# Patient Record
Sex: FEMALE | Race: ASIAN | NOT HISPANIC OR LATINO | URBAN - METROPOLITAN AREA
[De-identification: names, ages, dates, MRNs, and addresses within clinical notes are randomized per-mention and may not be internally consistent; named-entity substitution may affect disease eponyms.]

---

## 2017-03-01 ENCOUNTER — OUTPATIENT (OUTPATIENT)
Dept: OUTPATIENT SERVICES | Facility: HOSPITAL | Age: 37
LOS: 1 days | End: 2017-03-01

## 2017-03-01 VITALS
TEMPERATURE: 98 F | WEIGHT: 110.01 LBS | HEART RATE: 76 BPM | DIASTOLIC BLOOD PRESSURE: 60 MMHG | RESPIRATION RATE: 16 BRPM | SYSTOLIC BLOOD PRESSURE: 100 MMHG | HEIGHT: 61 IN

## 2017-03-01 DIAGNOSIS — K60.2 ANAL FISSURE, UNSPECIFIED: ICD-10-CM

## 2017-03-01 LAB
HCG SERPL-ACNC: < 5 MIU/ML — SIGNIFICANT CHANGE UP
HCT VFR BLD CALC: 34.9 % — SIGNIFICANT CHANGE UP (ref 34.5–45)
HGB BLD-MCNC: 11.1 G/DL — LOW (ref 11.5–15.5)
MCHC RBC-ENTMCNC: 27.8 PG — SIGNIFICANT CHANGE UP (ref 27–34)
MCHC RBC-ENTMCNC: 31.8 % — LOW (ref 32–36)
MCV RBC AUTO: 87.3 FL — SIGNIFICANT CHANGE UP (ref 80–100)
PLATELET # BLD AUTO: 317 K/UL — SIGNIFICANT CHANGE UP (ref 150–400)
PMV BLD: 10.5 FL — SIGNIFICANT CHANGE UP (ref 7–13)
RBC # BLD: 4 M/UL — SIGNIFICANT CHANGE UP (ref 3.8–5.2)
RBC # FLD: 14 % — SIGNIFICANT CHANGE UP (ref 10.3–14.5)
WBC # BLD: 5.58 K/UL — SIGNIFICANT CHANGE UP (ref 3.8–10.5)
WBC # FLD AUTO: 5.58 K/UL — SIGNIFICANT CHANGE UP (ref 3.8–10.5)

## 2017-03-01 NOTE — H&P PST ADULT - GASTROINTESTINAL COMMENTS
Chronic constipation, takes Linzess with improvement.  Pt reports hx of rectal bleeding with BM's which started 2 years ago, and stopped 2 months ago since she started taking medications for constipation.   Pt reports rectal pain staretd 1 month and worsened.  Dx with anal fissure

## 2017-03-01 NOTE — H&P PST ADULT - PMH
GERD (gastroesophageal reflux disease) Chronic constipation    GERD (gastroesophageal reflux disease)

## 2017-03-01 NOTE — H&P PST ADULT - ASSESSMENT
36 y/o female with 2 year hx of constipation and rectal bleeding with BM's. Pt reports she has not had rectal bleeding in 2 months since she started on medications for constipation.  Rectal pain started x 1 month, and has been worsening. Dx with anal fissure, scheduled for examination under anesthesia sphincterotomy anal fissurectomy 3/2/17.

## 2017-03-01 NOTE — H&P PST ADULT - HISTORY OF PRESENT ILLNESS
38 y/o female with hx of rectal pain x 1 month, worsening. Dx with ranal fissure, scheduled for examination under anesthesia sphincterotomy anal fissurectomy 3/2/17. 38 y/o female with 2 year hx of constipation and rectal bleeding with BM's. Pt reports she has not had rectal bleeding in 2 months since she started on medications for constipation.  Rectal pain started x 1 month, and has been worsening. Dx with anal fissure, scheduled for examination under anesthesia sphincterotomy anal fissurectomy 3/2/17.

## 2017-03-01 NOTE — H&P PST ADULT - FAMILY HISTORY
Mother  Still living? Yes, Estimated age: Age Unknown  Family history of diabetes mellitus, Age at diagnosis: Age Unknown     Sibling  Still living? Yes, Estimated age: Age Unknown  Family history of breast cancer, Age at diagnosis: Age Unknown

## 2017-03-02 ENCOUNTER — OUTPATIENT (OUTPATIENT)
Dept: OUTPATIENT SERVICES | Facility: HOSPITAL | Age: 37
LOS: 1 days | Discharge: HOME | End: 2017-03-02
Payer: MEDICAID

## 2017-03-02 VITALS
HEART RATE: 59 BPM | RESPIRATION RATE: 16 BRPM | OXYGEN SATURATION: 99 % | HEIGHT: 61 IN | DIASTOLIC BLOOD PRESSURE: 58 MMHG | SYSTOLIC BLOOD PRESSURE: 131 MMHG | TEMPERATURE: 99 F | WEIGHT: 110.01 LBS

## 2017-03-02 VITALS
HEART RATE: 86 BPM | OXYGEN SATURATION: 100 % | SYSTOLIC BLOOD PRESSURE: 116 MMHG | TEMPERATURE: 208 F | DIASTOLIC BLOOD PRESSURE: 65 MMHG | RESPIRATION RATE: 20 BRPM

## 2017-03-02 DIAGNOSIS — K60.2 ANAL FISSURE, UNSPECIFIED: ICD-10-CM

## 2017-03-02 PROCEDURE — 88304 TISSUE EXAM BY PATHOLOGIST: CPT | Mod: 26

## 2017-03-02 RX ORDER — OXYCODONE HYDROCHLORIDE 5 MG/1
1 TABLET ORAL
Qty: 30 | Refills: 0 | OUTPATIENT
Start: 2017-03-02

## 2017-03-07 LAB — SURGICAL PATHOLOGY STUDY: SIGNIFICANT CHANGE UP

## 2017-06-29 NOTE — ASU PREOPERATIVE ASSESSMENT, ADULT ([facility] ONLY) - SPO2 (%)
Please call patient, results are within normal limits.
Please call patient, results are within normal limits.
99

## 2018-02-04 ENCOUNTER — INPATIENT (INPATIENT)
Facility: HOSPITAL | Age: 38
LOS: 1 days | Discharge: HOME | End: 2018-02-06
Attending: SPECIALIST | Admitting: SPECIALIST

## 2018-02-04 VITALS — HEIGHT: 63 IN | WEIGHT: 141.1 LBS

## 2018-02-04 DIAGNOSIS — O26.899 OTHER SPECIFIED PREGNANCY RELATED CONDITIONS, UNSPECIFIED TRIMESTER: ICD-10-CM

## 2018-02-04 LAB
BASOPHILS # BLD AUTO: 0.05 K/UL — SIGNIFICANT CHANGE UP (ref 0–0.2)
BASOPHILS NFR BLD AUTO: 0.4 % — SIGNIFICANT CHANGE UP (ref 0–2)
BLD GP AB SCN SERPL QL: NEGATIVE — SIGNIFICANT CHANGE UP
EOSINOPHIL # BLD AUTO: 0.14 K/UL — SIGNIFICANT CHANGE UP (ref 0–0.5)
EOSINOPHIL NFR BLD AUTO: 1.2 % — SIGNIFICANT CHANGE UP (ref 0–6)
HCT VFR BLD CALC: 38.6 % — SIGNIFICANT CHANGE UP (ref 34.5–45)
HGB BLD-MCNC: 13 G/DL — SIGNIFICANT CHANGE UP (ref 11.5–15.5)
IMM GRANULOCYTES # BLD AUTO: 0.33 # — SIGNIFICANT CHANGE UP
IMM GRANULOCYTES NFR BLD AUTO: 2.8 % — HIGH (ref 0–1.5)
LYMPHOCYTES # BLD AUTO: 18.3 % — SIGNIFICANT CHANGE UP (ref 13–44)
LYMPHOCYTES # BLD AUTO: 2.14 K/UL — SIGNIFICANT CHANGE UP (ref 1–3.3)
MCHC RBC-ENTMCNC: 31.3 PG — SIGNIFICANT CHANGE UP (ref 27–34)
MCHC RBC-ENTMCNC: 33.7 % — SIGNIFICANT CHANGE UP (ref 32–36)
MCV RBC AUTO: 93 FL — SIGNIFICANT CHANGE UP (ref 80–100)
MONOCYTES # BLD AUTO: 0.93 K/UL — HIGH (ref 0–0.9)
MONOCYTES NFR BLD AUTO: 7.9 % — SIGNIFICANT CHANGE UP (ref 2–14)
NEUTROPHILS # BLD AUTO: 8.11 K/UL — HIGH (ref 1.8–7.4)
NEUTROPHILS NFR BLD AUTO: 69.4 % — SIGNIFICANT CHANGE UP (ref 43–77)
NRBC # FLD: 0 — SIGNIFICANT CHANGE UP
PLATELET # BLD AUTO: 261 K/UL — SIGNIFICANT CHANGE UP (ref 150–400)
PMV BLD: 10.2 FL — SIGNIFICANT CHANGE UP (ref 7–13)
RBC # BLD: 4.15 M/UL — SIGNIFICANT CHANGE UP (ref 3.8–5.2)
RBC # FLD: 14.2 % — SIGNIFICANT CHANGE UP (ref 10.3–14.5)
RH IG SCN BLD-IMP: POSITIVE — SIGNIFICANT CHANGE UP
RH IG SCN BLD-IMP: POSITIVE — SIGNIFICANT CHANGE UP
WBC # BLD: 11.7 K/UL — HIGH (ref 3.8–10.5)
WBC # FLD AUTO: 11.7 K/UL — HIGH (ref 3.8–10.5)

## 2018-02-04 RX ORDER — OXYTOCIN 10 UNIT/ML
333.33 VIAL (ML) INJECTION
Qty: 20 | Refills: 0 | Status: DISCONTINUED | OUTPATIENT
Start: 2018-02-04 | End: 2018-02-05

## 2018-02-04 RX ORDER — SODIUM CHLORIDE 9 MG/ML
1000 INJECTION, SOLUTION INTRAVENOUS
Qty: 0 | Refills: 0 | Status: DISCONTINUED | OUTPATIENT
Start: 2018-02-04 | End: 2018-02-04

## 2018-02-04 RX ORDER — OXYTOCIN 10 UNIT/ML
333.33 VIAL (ML) INJECTION
Qty: 20 | Refills: 0 | Status: COMPLETED | OUTPATIENT
Start: 2018-02-04

## 2018-02-04 RX ORDER — OXYTOCIN 10 UNIT/ML
2 VIAL (ML) INJECTION
Qty: 30 | Refills: 0 | Status: DISCONTINUED | OUTPATIENT
Start: 2018-02-04 | End: 2018-02-05

## 2018-02-04 RX ORDER — SODIUM CHLORIDE 9 MG/ML
1000 INJECTION, SOLUTION INTRAVENOUS ONCE
Qty: 0 | Refills: 0 | Status: DISCONTINUED | OUTPATIENT
Start: 2018-02-04 | End: 2018-02-04

## 2018-02-04 RX ADMIN — SODIUM CHLORIDE 250 MILLILITER(S): 9 INJECTION, SOLUTION INTRAVENOUS at 12:06

## 2018-02-04 RX ADMIN — Medication 1000 MILLIUNIT(S)/MIN: at 21:35

## 2018-02-04 RX ADMIN — Medication 2 MILLIUNIT(S)/MIN: at 13:44

## 2018-02-04 NOTE — DISCHARGE NOTE OB - MEDICATION SUMMARY - MEDICATIONS TO TAKE
I will START or STAY ON the medications listed below when I get home from the hospital:    acetaminophen 325 mg oral tablet  -- 3 tab(s) by mouth every 6 hours, As Needed  -- Indication: For moderate pain    ibuprofen 600 mg oral tablet  -- 1 tab(s) by mouth every 6 hours, As Needed  -- Indication: For moderate pain    Prenatal Multivitamins with Folic Acid 1 mg oral tablet  -- 1 tab(s) by mouth once a day  -- Indication: For supplement

## 2018-02-04 NOTE — DISCHARGE NOTE OB - MEDICATION SUMMARY - MEDICATIONS TO STOP TAKING
I will STOP taking the medications listed below when I get home from the hospital:    acetaminophen-oxyCODONE 325 mg-5 mg oral tablet  -- 1 tab(s) by mouth every 3 to 4 hours, As Needed -for severe pain MDD:8  -- Caution federal law prohibits the transfer of this drug to any person other  than the person for whom it was prescribed.  May cause drowsiness.  Alcohol may intensify this effect.  Use care when operating dangerous machinery.  This prescription cannot be refilled.  This product contains acetaminophen.  Do not use  with any other product containing acetaminophen to prevent possible liver damage.  Using more of this medication than prescribed may cause serious breathing problems.    Pepcid 20 mg oral tablet  -- 1 tab(s) by mouth 2 times a day

## 2018-02-04 NOTE — DISCHARGE NOTE OB - MATERIALS PROVIDED
Guide to Postpartum Care/[facility] Hearing Screen Program/Tdap Vaccination (VIS Pub Date: 2012)/Vaccinations/[facility]  Screening Program/Shaken Baby Prevention Handout/[place]  Immunization Record

## 2018-02-04 NOTE — DISCHARGE NOTE OB - CARE PROVIDER_API CALL
Ruth Green), Obstetrics and Gynecology  [address] [place] [address]  Phone: [phone]  Fax: [phone]

## 2018-02-04 NOTE — DISCHARGE NOTE OB - PATIENT PORTAL LINK FT
You can access the [facility] Patient Portal, offered by [facility], by registering with the following website: [email or web address]

## 2018-02-05 LAB — T PALLIDUM AB TITR SER: NEGATIVE — SIGNIFICANT CHANGE UP

## 2018-02-05 RX ORDER — SIMETHICONE 80 MG/1
80 TABLET, CHEWABLE ORAL EVERY 6 HOURS
Qty: 0 | Refills: 0 | Status: DISCONTINUED | OUTPATIENT
Start: 2018-02-05 | End: 2018-02-06

## 2018-02-05 RX ORDER — IBUPROFEN 200 MG
1 TABLET ORAL
Qty: 0 | Refills: 0 | COMMUNITY
Start: 2018-02-05

## 2018-02-05 RX ORDER — FAMOTIDINE 10 MG/ML
1 INJECTION INTRAVENOUS
Qty: 0 | Refills: 0 | COMMUNITY

## 2018-02-05 RX ORDER — DOCUSATE SODIUM 100 MG
100 CAPSULE ORAL
Qty: 0 | Refills: 0 | Status: DISCONTINUED | OUTPATIENT
Start: 2018-02-05 | End: 2018-02-06

## 2018-02-05 RX ORDER — HYDROCORTISONE 1 %
1 OINTMENT (GRAM) TOPICAL EVERY 4 HOURS
Qty: 0 | Refills: 0 | Status: DISCONTINUED | OUTPATIENT
Start: 2018-02-05 | End: 2018-02-06

## 2018-02-05 RX ORDER — OXYCODONE HYDROCHLORIDE 5 MG/1
5 TABLET ORAL
Qty: 0 | Refills: 0 | Status: DISCONTINUED | OUTPATIENT
Start: 2018-02-05 | End: 2018-02-06

## 2018-02-05 RX ORDER — OXYCODONE HYDROCHLORIDE 5 MG/1
5 TABLET ORAL EVERY 4 HOURS
Qty: 0 | Refills: 0 | Status: DISCONTINUED | OUTPATIENT
Start: 2018-02-05 | End: 2018-02-06

## 2018-02-05 RX ORDER — ACETAMINOPHEN 500 MG
975 TABLET ORAL EVERY 6 HOURS
Qty: 0 | Refills: 0 | Status: DISCONTINUED | OUTPATIENT
Start: 2018-02-05 | End: 2018-02-06

## 2018-02-05 RX ORDER — IBUPROFEN 200 MG
600 TABLET ORAL EVERY 6 HOURS
Qty: 0 | Refills: 0 | Status: DISCONTINUED | OUTPATIENT
Start: 2018-02-05 | End: 2018-02-06

## 2018-02-05 RX ORDER — TETANUS TOXOID, REDUCED DIPHTHERIA TOXOID AND ACELLULAR PERTUSSIS VACCINE, ADSORBED 5; 2.5; 8; 8; 2.5 [IU]/.5ML; [IU]/.5ML; UG/.5ML; UG/.5ML; UG/.5ML
0.5 SUSPENSION INTRAMUSCULAR ONCE
Qty: 0 | Refills: 0 | Status: DISCONTINUED | OUTPATIENT
Start: 2018-02-05 | End: 2018-02-06

## 2018-02-05 RX ORDER — ACETAMINOPHEN 500 MG
975 TABLET ORAL EVERY 6 HOURS
Qty: 0 | Refills: 0 | Status: COMPLETED | OUTPATIENT
Start: 2018-02-05 | End: 2019-01-04

## 2018-02-05 RX ORDER — LANOLIN
1 OINTMENT (GRAM) TOPICAL EVERY 6 HOURS
Qty: 0 | Refills: 0 | Status: DISCONTINUED | OUTPATIENT
Start: 2018-02-05 | End: 2018-02-06

## 2018-02-05 RX ORDER — GLYCERIN ADULT
1 SUPPOSITORY, RECTAL RECTAL AT BEDTIME
Qty: 0 | Refills: 0 | Status: DISCONTINUED | OUTPATIENT
Start: 2018-02-05 | End: 2018-02-06

## 2018-02-05 RX ORDER — ACETAMINOPHEN 500 MG
3 TABLET ORAL
Qty: 0 | Refills: 0 | COMMUNITY
Start: 2018-02-05

## 2018-02-05 RX ORDER — SODIUM CHLORIDE 9 MG/ML
3 INJECTION INTRAMUSCULAR; INTRAVENOUS; SUBCUTANEOUS EVERY 8 HOURS
Qty: 0 | Refills: 0 | Status: DISCONTINUED | OUTPATIENT
Start: 2018-02-05 | End: 2018-02-06

## 2018-02-05 RX ORDER — IBUPROFEN 200 MG
600 TABLET ORAL EVERY 6 HOURS
Qty: 0 | Refills: 0 | Status: COMPLETED | OUTPATIENT
Start: 2018-02-05 | End: 2019-01-04

## 2018-02-05 RX ORDER — PRAMOXINE HYDROCHLORIDE 150 MG/15G
1 AEROSOL, FOAM RECTAL EVERY 4 HOURS
Qty: 0 | Refills: 0 | Status: DISCONTINUED | OUTPATIENT
Start: 2018-02-05 | End: 2018-02-05

## 2018-02-05 RX ORDER — OXYTOCIN 10 UNIT/ML
41.67 VIAL (ML) INJECTION
Qty: 20 | Refills: 0 | Status: DISCONTINUED | OUTPATIENT
Start: 2018-02-05 | End: 2018-02-05

## 2018-02-05 RX ORDER — DIBUCAINE 1 %
1 OINTMENT (GRAM) RECTAL EVERY 4 HOURS
Qty: 0 | Refills: 0 | Status: DISCONTINUED | OUTPATIENT
Start: 2018-02-05 | End: 2018-02-06

## 2018-02-05 RX ORDER — AER TRAVELER 0.5 G/1
1 SOLUTION RECTAL; TOPICAL EVERY 4 HOURS
Qty: 0 | Refills: 0 | Status: DISCONTINUED | OUTPATIENT
Start: 2018-02-05 | End: 2018-02-06

## 2018-02-05 RX ORDER — MAGNESIUM HYDROXIDE 400 MG/1
30 TABLET, CHEWABLE ORAL
Qty: 0 | Refills: 0 | Status: DISCONTINUED | OUTPATIENT
Start: 2018-02-05 | End: 2018-02-06

## 2018-02-05 RX ORDER — PRAMOXINE HYDROCHLORIDE 150 MG/15G
1 AEROSOL, FOAM RECTAL EVERY 4 HOURS
Qty: 0 | Refills: 0 | Status: DISCONTINUED | OUTPATIENT
Start: 2018-02-05 | End: 2018-02-06

## 2018-02-05 RX ORDER — DIPHENHYDRAMINE HCL 50 MG
25 CAPSULE ORAL EVERY 6 HOURS
Qty: 0 | Refills: 0 | Status: DISCONTINUED | OUTPATIENT
Start: 2018-02-05 | End: 2018-02-06

## 2018-02-05 RX ORDER — HYDROCORTISONE 1 %
1 OINTMENT (GRAM) TOPICAL EVERY 4 HOURS
Qty: 0 | Refills: 0 | Status: DISCONTINUED | OUTPATIENT
Start: 2018-02-05 | End: 2018-02-05

## 2018-02-05 RX ADMIN — Medication 600 MILLIGRAM(S): at 09:25

## 2018-02-05 RX ADMIN — Medication 975 MILLIGRAM(S): at 09:55

## 2018-02-05 RX ADMIN — Medication 600 MILLIGRAM(S): at 18:45

## 2018-02-05 RX ADMIN — Medication 1 APPLICATION(S): at 21:14

## 2018-02-05 RX ADMIN — AER TRAVELER 1 APPLICATION(S): 0.5 SOLUTION RECTAL; TOPICAL at 21:15

## 2018-02-05 RX ADMIN — Medication 600 MILLIGRAM(S): at 09:55

## 2018-02-05 RX ADMIN — SIMETHICONE 80 MILLIGRAM(S): 80 TABLET, CHEWABLE ORAL at 18:24

## 2018-02-05 RX ADMIN — SODIUM CHLORIDE 3 MILLILITER(S): 9 INJECTION INTRAMUSCULAR; INTRAVENOUS; SUBCUTANEOUS at 22:22

## 2018-02-05 RX ADMIN — Medication 600 MILLIGRAM(S): at 18:15

## 2018-02-05 RX ADMIN — PRAMOXINE HYDROCHLORIDE 1 APPLICATION(S): 150 AEROSOL, FOAM RECTAL at 21:14

## 2018-02-05 RX ADMIN — Medication 100 MILLIGRAM(S): at 09:27

## 2018-02-05 RX ADMIN — Medication 975 MILLIGRAM(S): at 09:25

## 2018-02-05 RX ADMIN — Medication 1 TABLET(S): at 09:27

## 2018-02-05 RX ADMIN — Medication 975 MILLIGRAM(S): at 18:15

## 2018-02-05 RX ADMIN — MAGNESIUM HYDROXIDE 30 MILLILITER(S): 400 TABLET, CHEWABLE ORAL at 21:14

## 2018-02-05 RX ADMIN — Medication 975 MILLIGRAM(S): at 18:45

## 2018-02-05 NOTE — PROGRESS NOTE ADULT - SUBJECTIVE AND OBJECTIVE BOX
Patient seen and examined at bedside, no acute overnight events. States she had a headache overnight and is having discomfort this morning but declined pain medicine until after she eats breakfast. Denies any HA, blurry vision, dizziness, CP/SOB, N/V. Patient is ambulating, voiding spontaneously, passing gas, and tolerating regular diet. Pt is breast feeding her baby.    Vital Signs Last 24 Hours  T(C): 36.7 (02-05-18 @ 06:38), Max: 36.8 (02-04-18 @ 22:36)  HR: 82 (02-05-18 @ 06:38) (82 - 107)  BP: 104/52 (02-05-18 @ 06:38) (96/51 - 141/62)  RR: 17 (02-05-18 @ 06:38) (17 - 19)  SpO2: 96% (02-05-18 @ 06:38) (95% - 100%)    Physical Exam:  General: NAD  CV: RRR  Pulm: CTAB  Abdomen: Soft, non-tender, non-distended  Pelvic: Lochia wnl; fundus firm and non-tender   Extremities: no calf tenderness noted on exam    Labs:    Blood Type: AB Positive  Antibody Screen: --  RPR: Negative               13.0   11.70 )-----------( 261      ( 02-04 @ 11:05 )             38.6         MEDICATIONS  (STANDING):  acetaminophen   Tablet. 975 milliGRAM(s) Oral every 6 hours  diphtheria/tetanus/pertussis (acellular) Vaccine (ADAcel) 0.5 milliLiter(s) IntraMuscular once  ibuprofen  Tablet 600 milliGRAM(s) Oral every 6 hours  oxyCODONE    IR 5 milliGRAM(s) Oral every 3 hours  oxytocin Infusion 41.667 milliUNIT(s)/Min (125 mL/Hr) IV Continuous <Continuous>  prenatal multivitamin 1 Tablet(s) Oral daily  sodium chloride 0.9% lock flush 3 milliLiter(s) IV Push every 8 hours    MEDICATIONS  (PRN):  dibucaine 1% Ointment 1 Application(s) Topical every 4 hours PRN Perineal Discomfort  diphenhydrAMINE   Capsule 25 milliGRAM(s) Oral every 6 hours PRN Itching  docusate sodium 100 milliGRAM(s) Oral two times a day PRN Stool Softening  glycerin Suppository - Adult 1 Suppository(s) Rectal at bedtime PRN Constipation  hydrocortisone 1% Cream 1 Application(s) Topical every 4 hours PRN perineal discomfort  lanolin Ointment 1 Application(s) Topical every 6 hours PRN Sore Nipples  magnesium hydroxide Suspension 30 milliLiter(s) Oral two times a day PRN Constipation  oxyCODONE    IR 5 milliGRAM(s) Oral every 4 hours PRN Severe Pain (7 -10)  pramoxine 1%/zinc 5% Cream 1 Application(s) Topical every 4 hours PRN perineal discomfort  simethicone 80 milliGRAM(s) Chew every 6 hours PRN Gas  witch hazel Pads 1 Application(s) Topical every 4 hours PRN Perineal Discomfort

## 2018-02-05 NOTE — PROGRESS NOTE ADULT - PROBLEM SELECTOR PLAN 1
- Continue with po analgesia  - Increase ambulation  - Continue regular diet  - IV lock  - No labs    KODY Chi, PGY-1

## 2018-02-06 VITALS
TEMPERATURE: 98 F | DIASTOLIC BLOOD PRESSURE: 63 MMHG | SYSTOLIC BLOOD PRESSURE: 109 MMHG | HEART RATE: 73 BPM | OXYGEN SATURATION: 97 % | RESPIRATION RATE: 16 BRPM

## 2018-02-06 RX ADMIN — Medication 975 MILLIGRAM(S): at 12:45

## 2018-02-06 RX ADMIN — Medication 975 MILLIGRAM(S): at 07:14

## 2018-02-06 RX ADMIN — SIMETHICONE 80 MILLIGRAM(S): 80 TABLET, CHEWABLE ORAL at 06:44

## 2018-02-06 RX ADMIN — Medication 975 MILLIGRAM(S): at 01:17

## 2018-02-06 RX ADMIN — Medication 975 MILLIGRAM(S): at 06:44

## 2018-02-06 RX ADMIN — Medication 600 MILLIGRAM(S): at 01:50

## 2018-02-06 RX ADMIN — Medication 600 MILLIGRAM(S): at 01:17

## 2018-02-06 RX ADMIN — Medication 975 MILLIGRAM(S): at 12:15

## 2018-02-06 RX ADMIN — Medication 600 MILLIGRAM(S): at 12:15

## 2018-02-06 RX ADMIN — Medication 1 TABLET(S): at 12:18

## 2018-02-06 RX ADMIN — SIMETHICONE 80 MILLIGRAM(S): 80 TABLET, CHEWABLE ORAL at 01:17

## 2018-02-06 RX ADMIN — Medication 600 MILLIGRAM(S): at 06:44

## 2018-02-06 RX ADMIN — Medication 100 MILLIGRAM(S): at 06:44

## 2018-02-06 RX ADMIN — Medication 600 MILLIGRAM(S): at 12:45

## 2018-02-06 RX ADMIN — Medication 600 MILLIGRAM(S): at 07:14

## 2018-02-06 RX ADMIN — Medication 975 MILLIGRAM(S): at 01:50

## 2018-02-06 NOTE — PROGRESS NOTE ADULT - SUBJECTIVE AND OBJECTIVE BOX
SUBJECTIVE:    Pain: Controlled    Complaints: None    MILESTONES:     Alert and oriented x 3  [x  ]  Out of bed /ambulating [  x]    Voiding [x  ]  Due to void [  ]    Tolerating a regular diet.    Infant feeding:   Breast [ x ]   Bottle [  ]     Both [ x ]                         Feeding related issues or concerns:    Objective   T(C): 36.8 (18 @ 06:00), Max: 36.9 (18 @ 18:16)  HR: 73 (18 @ 06:00) (62 - 87)  BP: 109/63 (18 @ 06:00) (106/56 - 123/68)  RR: 16 (18 @ 06:00) (16 - 19)  SpO2: 97% (18 @ 06:00) (97% - 100%)  Wt(kg): --                        13.0   11.70 )-----------( 261      ( 2018 11:05 )             38.6         Blood Type: AB Positive    RPR: Negative          MEDICATIONS  (STANDING):  acetaminophen   Tablet. 975 milliGRAM(s) Oral every 6 hours  diphtheria/tetanus/pertussis (acellular) Vaccine (ADAcel) 0.5 milliLiter(s) IntraMuscular once  ibuprofen  Tablet 600 milliGRAM(s) Oral every 6 hours  oxyCODONE    IR 5 milliGRAM(s) Oral every 3 hours  prenatal multivitamin 1 Tablet(s) Oral daily  sodium chloride 0.9% lock flush 3 milliLiter(s) IV Push every 8 hours    MEDICATIONS  (PRN):  dibucaine 1% Ointment 1 Application(s) Topical every 4 hours PRN Perineal Discomfort  diphenhydrAMINE   Capsule 25 milliGRAM(s) Oral every 6 hours PRN Itching  docusate sodium 100 milliGRAM(s) Oral two times a day PRN Stool Softening  glycerin Suppository - Adult 1 Suppository(s) Rectal at bedtime PRN Constipation  hydrocortisone 1% Cream 1 Application(s) Topical every 4 hours PRN perineal discomfort  lanolin Ointment 1 Application(s) Topical every 6 hours PRN Sore Nipples  magnesium hydroxide Suspension 30 milliLiter(s) Oral two times a day PRN Constipation  oxyCODONE    IR 5 milliGRAM(s) Oral every 4 hours PRN Severe Pain (7 -10)  pramoxine 1%/zinc 5% Cream 1 Application(s) Topical every 4 hours PRN perineal discomfort  simethicone 80 milliGRAM(s) Chew every 6 hours PRN Gas  witch hazel Pads 1 Application(s) Topical every 4 hours PRN Perineal Discomfort      ASSESSMENT:      38y      G  3  P        PP Day #  2     Delivery:   [x  ]             [   ]    Assisted delivery  :    Vacuum   [   ]   Forceps)  [  ]    Indication for assisted delivery: Tracing Abn [  ]     Maternal Exhaustion [  ]     Other [  ]    Past medical history significant for HPI:  Hx. Hemorrhoidectomy - 2017, Psoriasis      Current Issues:     Breasts: Soft [x  ]    Engorged [  ]  Nipples:  Abdomen: Soft [ x ]  Nontender [  ]  Nondistended [  ]   Distended [  ]    Vagina: Lochia   Mod [x  ]      Scant [  ]  Heavy  [  ]    Perineum:  Intact [  ]   Laceration   [ x ]   Type of laceration   [ 2nd degree   ]    Episiotomy [  ]    Type of episiotomy  [              ]    Lower extremities: Edema  [  ] noted bilaterally .  Nontender Thai  [  ]  Negative Abhinav's sign [  ] Positive pedal pulses [  ]    Other relevant physical exam findings;      PLAN:    Plan: Increase ambulation, analgesia PRN and pain medication protocal standing oxycodone, ibuprofen and acetaminophen.    Diet: Continue regular diet    Labs:    Continue routine postpartum care.     Discharge Planning [x  ]    For  Discharge Today  [ x  ]    Consults :  Social Work [  ]     Lactation [x  ]    Other [      ]

## 2019-09-12 ENCOUNTER — EMERGENCY (EMERGENCY)
Facility: HOSPITAL | Age: 39
LOS: 1 days | Discharge: HOME | End: 2019-09-12
Payer: MEDICAID

## 2019-09-12 VITALS
DIASTOLIC BLOOD PRESSURE: 63 MMHG | TEMPERATURE: 99 F | HEART RATE: 93 BPM | OXYGEN SATURATION: 100 % | SYSTOLIC BLOOD PRESSURE: 116 MMHG | RESPIRATION RATE: 16 BRPM

## 2019-09-12 VITALS
SYSTOLIC BLOOD PRESSURE: 112 MMHG | OXYGEN SATURATION: 99 % | RESPIRATION RATE: 19 BRPM | HEART RATE: 60 BPM | DIASTOLIC BLOOD PRESSURE: 60 MMHG | TEMPERATURE: 98 F

## 2019-09-12 PROBLEM — K21.9 GASTRO-ESOPHAGEAL REFLUX DISEASE WITHOUT ESOPHAGITIS: Chronic | Status: ACTIVE | Noted: 2017-03-01

## 2019-09-12 PROBLEM — K59.09 OTHER CONSTIPATION: Chronic | Status: ACTIVE | Noted: 2017-03-01

## 2019-09-12 PROCEDURE — 73590 X-RAY EXAM OF LOWER LEG: CPT | Mod: 26,RT

## 2019-09-12 PROCEDURE — 73562 X-RAY EXAM OF KNEE 3: CPT | Mod: 26,RT

## 2019-09-12 PROCEDURE — 73610 X-RAY EXAM OF ANKLE: CPT | Mod: 26,RT

## 2019-09-12 PROCEDURE — 99283 EMERGENCY DEPT VISIT LOW MDM: CPT

## 2019-09-12 RX ORDER — ACETAMINOPHEN 500 MG
650 TABLET ORAL ONCE
Refills: 0 | Status: COMPLETED | OUTPATIENT
Start: 2019-09-12 | End: 2019-09-12

## 2019-09-12 RX ADMIN — Medication 650 MILLIGRAM(S): at 14:25

## 2019-09-12 NOTE — ED PROVIDER NOTE - PHYSICAL EXAMINATION
Skin: [id number] skin abrasion to right mid shin  full ROM of right knee/hip/ankle, pt is able to ambulate although with pain

## 2019-09-12 NOTE — ED ADULT TRIAGE NOTE - CHIEF COMPLAINT QUOTE
Pt. c/o skin tear to right LE. 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 c/o pain to entire leg with burning sensation around wound. Ambulating in triage.

## 2019-09-12 NOTE — ED PROVIDER NOTE - CLINICAL SUMMARY MEDICAL DECISION MAKING FREE TEXT BOX
39yF w/no pmhx p/w right leg pain and abrasion s/p injury today. Will xray to r/o fracture, tetanus is up to date. Will provide analgesia and reassess.

## 2019-09-12 NOTE — ED PROVIDER NOTE - PROGRESS NOTE DETAILS
CHAYO Oliver: Discussed normal xray findings with pt, she will apply bacitracin the her shin abrasion daily and follow up with her PMD, tylenol for pain

## 2019-09-12 NOTE — ED PROVIDER NOTE - OBJECTIVE STATEMENT
39yF w/no pmhx p/w right shin skin abrasion and pain s/p injury today. 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 Pt reports pain with walking to ankle, shin and knee although pt is able to ambulate. She was the school nurse who instructed her to come to the ER. Last tetanus in 2018. Pt denies numbness/tingling, hx of prior orthopedic injury, head injury, fall, LOC or any other concerns.

## 2019-09-12 NOTE — ED PROVIDER NOTE - PATIENT PORTAL LINK FT
You can access the FollowMyHealth Patient Portal offered by [facility] by registering at the following website: [email or web address]. By joining [facility]’s FollowMyHealth portal, you will also be able to view your health information using other applications (apps) compatible with our system.

## 2021-01-01 NOTE — ED PROVIDER NOTE - NSFOLLOWUPINSTRUCTIONS_ED_ALL_ED_FT
Follow up with your primary care provider within 1 week  Apply bacitracin to area daily  Take Tylenol 650mg every 6 hours as needed for pain  Return to the ER with any worsening or concerning symptoms, increased pain or swelling, weakness, numbness/tingling or any other concerns.
2021 16:11

## 2021-05-17 ENCOUNTER — EMERGENCY (EMERGENCY)
Facility: HOSPITAL | Age: 41
LOS: 1 days | Discharge: HOME | End: 2021-05-17
Attending: EMERGENCY MEDICINE | Admitting: EMERGENCY MEDICINE
Payer: MEDICAID

## 2021-05-17 VITALS
OXYGEN SATURATION: 100 % | TEMPERATURE: 98 F | HEART RATE: 73 BPM | SYSTOLIC BLOOD PRESSURE: 122 MMHG | HEIGHT: 63 IN | DIASTOLIC BLOOD PRESSURE: 71 MMHG | RESPIRATION RATE: 20 BRPM

## 2021-05-17 PROCEDURE — 71045 X-RAY EXAM CHEST 1 VIEW: CPT | Mod: 26

## 2021-05-17 PROCEDURE — 99284 EMERGENCY DEPT VISIT MOD MDM: CPT

## 2021-05-17 RX ORDER — DEXAMETHASONE 0.5 MG/5ML
8 ELIXIR ORAL ONCE
Refills: 0 | Status: COMPLETED | OUTPATIENT
Start: 2021-05-17 | End: 2021-05-17

## 2021-05-17 RX ADMIN — Medication 8 MILLIGRAM(S): at 10:57

## 2021-05-17 NOTE — ED PROVIDER NOTE - CLINICAL SUMMARY MEDICAL DECISION MAKING FREE TEXT BOX
Pt is 40 yo who presents w/ cough and ocular pruritis  -Chest Xray ordered  -8mg PO Dexamethasone given at bedside

## 2021-05-17 NOTE — ED ADULT NURSE NOTE - CHIEF COMPLAINT QUOTE
Pt c/o cough, CP, itchy eyes, runny nose, and HA  x3 weeks.  Denies fever.  Covid vaccinated in February.  Went to [facility] 1 week ago and tested covid neg and was given allergy medicine and antibiotics with no relief.  Hx seasonal allergies

## 2021-05-17 NOTE — ED PROVIDER NOTE - OBJECTIVE STATEMENT
42yo who presents w/ productive cough and itchy eyes for 1 week. She notes that she has seasonal allergies, but this year her cough and itchy eyes are worse then before. She also notes pain when she is coughing, and has trouble sleeping at night due to the coughing. She went to [facility] last week when her symptoms began, and was given azithromycin, cetrizine. She notes that these have relieve her symptoms a little bit, but continues to have a cough and itchiness. She also has taken loratadine and cough syrup for her symptoms but without relief. Denies N/V/Sob/F

## 2021-05-17 NOTE — ED PROVIDER NOTE - NSFOLLOWUPINSTRUCTIONS_ED_ALL_ED_FT
You were seen in the emergency room today for persistent cough, and itchy eyes.   -Chest Xray with no acute findings.  -Oral Dexamethasone give at bedside, should offer relief over next 3 days.  -Eye drops disepensed at bedside.  -Return to ED if you have worsening symptoms, fevers, trouble breathing.  -Please follow up with your Primary Care Physician for continued management.

## 2021-05-17 NOTE — ED ADULT NURSE NOTE - OBJECTIVE STATEMENT
Pt presents to intake, A&Ox4, ambulatory w/o assistance, here for evaluation of cough, itchiness to eyes, pain when coughing and congestion x1wk. Pt states she was seen at urgent care, given medications which have relieved her symptoms for some time. Denies chest pain, shortness of breath, palpitations, diaphoresis, headaches, fevers, dizziness, nausea, vomiting, diarrhea, or urinary symptoms at this time. No IV at this time as per MD orders, meds given as ordered. Will continue to monitor.

## 2021-05-17 NOTE — ED ADULT NURSE NOTE - NSIMPLEMENTINTERV_GEN_ALL_ED
Adalberto Rea   Patient is a 71y.o. year old female who presents with:  Chief Complaint   Patient presents with    Facial Pain     possible bells palesy, or stroke signs on started on thursday, sore throat seems not be able to breath. started taking hydroxychloroquuine developed issues and since she stopped taking, symptoms still present. HPI    Facial weakness  Onset five days ago. Unchanged since onset. Complains of weakness at the left face including left mouth, eyelid, eyebrow. Complains also of decreased hearing in the left ear. Denies facial numbness, facial pain, vision changes, weakness or numbness in the extremities, confusion, disequilibrium. Review of Systems   Constitutional: Positive for fatigue. Negative for fever. HENT: Positive for hearing loss. Negative for ear pain and trouble swallowing. Eyes: Negative for pain and visual disturbance. Respiratory: Negative for shortness of breath. Cardiovascular: Negative for chest pain. Musculoskeletal: Negative for gait problem. Skin: Negative for rash. Neurological: Positive for facial asymmetry and weakness. Negative for speech difficulty, light-headedness, numbness and headaches.        Health Maintenance Due   Topic Date Due    DTaP/Tdap/Td vaccine (1 - Tdap) 05/28/1972    Cervical cancer screen  05/28/1974    Breast cancer screen  05/28/1993    Shingles Vaccine (1 of 2 - 2 Dose Series) 05/28/2003    Pneumococcal low/med risk (1 of 2 - PCV13) 05/28/2018    Flu vaccine (1) 09/01/2018       Medications:   Current Outpatient Prescriptions   Medication Sig Dispense Refill    predniSONE (DELTASONE) 20 MG tablet Take 1 tablet by mouth 2 times daily for 10 days 20 tablet 0    valACYclovir (VALTREX) 1 g tablet Take 1 tablet by mouth 3 times daily for 7 days 21 tablet 0    tears naturale II (CELLUGEL) SOLN ophthalmic solution Place 1 drop into the left eye as needed for Dry Eyes 1 Bottle 2    ibuprofen (ADVIL;MOTRIN) 800 MG Topics Concern    None     Social History Narrative    None       OBJECTIVE    /86 (Site: Left Upper Arm, Position: Sitting, Cuff Size: Large Adult)   Pulse 84   Ht 6' (1.829 m)   Wt (!) 320 lb (145.2 kg)   SpO2 99%   BMI 43.40 kg/m²     Wt Readings from Last 3 Encounters:   12/18/18 (!) 320 lb (145.2 kg)   08/10/18 (!) 320 lb (145.2 kg)   12/01/17 280 lb (127 kg)       Physical Exam   Constitutional: She is oriented to person, place, and time. No distress. HENT:   Right Ear: Tympanic membrane, external ear and ear canal normal.   Left Ear: Tympanic membrane, external ear and ear canal normal. Decreased hearing is noted. Nose: Nose normal.   Mouth/Throat: Uvula is midline, oropharynx is clear and moist and mucous membranes are normal.   Eyes: Pupils are equal, round, and reactive to light. Conjunctivae and EOM are normal.   No visual field deficits   Neck: Carotid bruit is not present. Cardiovascular: Normal rate, regular rhythm, normal heart sounds and intact distal pulses. Pulmonary/Chest: Effort normal and breath sounds normal.   Neurological: She is alert and oriented to person, place, and time. She displays normal reflexes. No sensory deficit. Coordination (FNF, LEON normal) normal.   Weakness noted at the left face including limited rasing of the eyebrow, closing the eye but is able to fully close, weakness at the left mouth. Facial sensation equal b/l. Strength, sensation, reflexes symmetric and intact throughout the extremities. Skin: Skin is warm and dry. She is not diaphoretic. Psychiatric: She has a normal mood and affect. Her behavior is normal.       ASSESSMENT AND PLAN    1. Bell palsy  Begin prednisone, valtrex, artificial tears PRN. Provided with relevant printed material.  - predniSONE (DELTASONE) 20 MG tablet; Take 1 tablet by mouth 2 times daily for 10 days  Dispense: 20 tablet; Refill: 0  - valACYclovir (VALTREX) 1 g tablet;  Take 1 tablet by mouth 3 times daily for 7 Implemented All Universal Safety Interventions:  [place] to call system. Call bell, personal items and telephone within reach. Instruct patient to call for assistance. Room bathroom lighting operational. Non-slip footwear when patient is off stretcher. Physically safe environment: no spills, clutter or unnecessary equipment. Stretcher in lowest position, wheels locked, appropriate side rails in place.

## 2021-05-17 NOTE — ED PROVIDER NOTE - PATIENT PORTAL LINK FT
You can access the FollowMyHealth Patient Portal offered by [facility] by registering at the following website: [email or web address]. By joining [facility]’s FollowMyHealth portal, you will also be able to view your health information using other applications (apps) compatible with our system.

## 2021-05-17 NOTE — ED PROVIDER NOTE - FAMILY HISTORY
Family history of diabetes mellitus     Sibling  Still living? Yes, Estimated age: Age Unknown  Family history of breast cancer, Age at diagnosis: Age Unknown

## 2021-05-17 NOTE — ED ADULT TRIAGE NOTE - CHIEF COMPLAINT QUOTE
Pt c/o cough, CP, itchy eyes, runny nose, and HA  x3 weeks.  Denies fever.  Covid vaccinated in February.  Went to [facility] 1 week ago and tested covid neg and was given allergy medicine and antibiotics with no relief.  Hx seasonal allergies

## 2021-05-17 NOTE — ED PROVIDER NOTE - ADDITIONAL NOTES AND INSTRUCTIONS:
Please excuse Luz Maria Neal from work until May 21st, 2021 as she recovers and her symptoms improve.

## 2021-05-24 ENCOUNTER — EMERGENCY (EMERGENCY)
Facility: HOSPITAL | Age: 41
LOS: 1 days | Discharge: HOME | End: 2021-05-24
Attending: EMERGENCY MEDICINE | Admitting: EMERGENCY MEDICINE
Payer: MEDICAID

## 2021-05-24 VITALS
OXYGEN SATURATION: 100 % | HEART RATE: 71 BPM | DIASTOLIC BLOOD PRESSURE: 83 MMHG | RESPIRATION RATE: 18 BRPM | SYSTOLIC BLOOD PRESSURE: 145 MMHG

## 2021-05-24 VITALS
TEMPERATURE: 98 F | HEART RATE: 85 BPM | SYSTOLIC BLOOD PRESSURE: 124 MMHG | OXYGEN SATURATION: 100 % | DIASTOLIC BLOOD PRESSURE: 78 MMHG | HEIGHT: 63 IN | RESPIRATION RATE: 20 BRPM

## 2021-05-24 PROBLEM — L40.9 PSORIASIS, UNSPECIFIED: Chronic | Status: ACTIVE | Noted: 2021-05-17

## 2021-05-24 LAB
ALBUMIN SERPL ELPH-MCNC: 3.7 G/DL — SIGNIFICANT CHANGE UP (ref 3.3–5)
ALP SERPL-CCNC: 53 U/L — SIGNIFICANT CHANGE UP (ref 40–120)
ALT FLD-CCNC: 17 U/L — SIGNIFICANT CHANGE UP (ref 4–33)
ANION GAP SERPL CALC-SCNC: 11 MMOL/L — SIGNIFICANT CHANGE UP (ref 7–14)
AST SERPL-CCNC: 19 U/L — SIGNIFICANT CHANGE UP (ref 4–32)
BASOPHILS # BLD AUTO: 0.03 K/UL — SIGNIFICANT CHANGE UP (ref 0–0.2)
BASOPHILS NFR BLD AUTO: 0.5 % — SIGNIFICANT CHANGE UP (ref 0–2)
BILIRUB SERPL-MCNC: <0.2 MG/DL — SIGNIFICANT CHANGE UP (ref 0.2–1.2)
BUN SERPL-MCNC: 13 MG/DL — SIGNIFICANT CHANGE UP (ref 7–23)
CALCIUM SERPL-MCNC: 8.6 MG/DL — SIGNIFICANT CHANGE UP (ref 8.4–10.5)
CHLORIDE SERPL-SCNC: 104 MMOL/L — SIGNIFICANT CHANGE UP (ref 98–107)
CO2 SERPL-SCNC: 21 MMOL/L — LOW (ref 22–31)
CREAT SERPL-MCNC: 0.73 MG/DL — SIGNIFICANT CHANGE UP (ref 0.5–1.3)
EOSINOPHIL # BLD AUTO: 0.94 K/UL — HIGH (ref 0–0.5)
EOSINOPHIL NFR BLD AUTO: 15 % — HIGH (ref 0–6)
GLUCOSE SERPL-MCNC: 96 MG/DL — SIGNIFICANT CHANGE UP (ref 70–99)
HCG SERPL-ACNC: <5 MIU/ML — SIGNIFICANT CHANGE UP
HCT VFR BLD CALC: 32 % — LOW (ref 34.5–45)
HGB BLD-MCNC: 9.9 G/DL — LOW (ref 11.5–15.5)
IANC: 2.15 K/UL — SIGNIFICANT CHANGE UP (ref 1.5–8.5)
IMM GRANULOCYTES NFR BLD AUTO: 0.3 % — SIGNIFICANT CHANGE UP (ref 0–1.5)
LYMPHOCYTES # BLD AUTO: 2.5 K/UL — SIGNIFICANT CHANGE UP (ref 1–3.3)
LYMPHOCYTES # BLD AUTO: 39.9 % — SIGNIFICANT CHANGE UP (ref 13–44)
MCHC RBC-ENTMCNC: 24.9 PG — LOW (ref 27–34)
MCHC RBC-ENTMCNC: 30.9 GM/DL — LOW (ref 32–36)
MCV RBC AUTO: 80.6 FL — SIGNIFICANT CHANGE UP (ref 80–100)
MONOCYTES # BLD AUTO: 0.62 K/UL — SIGNIFICANT CHANGE UP (ref 0–0.9)
MONOCYTES NFR BLD AUTO: 9.9 % — SIGNIFICANT CHANGE UP (ref 2–14)
NEUTROPHILS # BLD AUTO: 2.15 K/UL — SIGNIFICANT CHANGE UP (ref 1.8–7.4)
NEUTROPHILS NFR BLD AUTO: 34.4 % — LOW (ref 43–77)
NRBC # BLD: 0 /100 WBCS — SIGNIFICANT CHANGE UP
NRBC # FLD: 0 K/UL — SIGNIFICANT CHANGE UP
PLATELET # BLD AUTO: 327 K/UL — SIGNIFICANT CHANGE UP (ref 150–400)
POTASSIUM SERPL-MCNC: 4.2 MMOL/L — SIGNIFICANT CHANGE UP (ref 3.5–5.3)
POTASSIUM SERPL-SCNC: 4.2 MMOL/L — SIGNIFICANT CHANGE UP (ref 3.5–5.3)
PROT SERPL-MCNC: 7.4 G/DL — SIGNIFICANT CHANGE UP (ref 6–8.3)
RBC # BLD: 3.97 M/UL — SIGNIFICANT CHANGE UP (ref 3.8–5.2)
RBC # FLD: 16.4 % — HIGH (ref 10.3–14.5)
SARS-COV-2 RNA SPEC QL NAA+PROBE: SIGNIFICANT CHANGE UP
SODIUM SERPL-SCNC: 136 MMOL/L — SIGNIFICANT CHANGE UP (ref 135–145)
WBC # BLD: 6.26 K/UL — SIGNIFICANT CHANGE UP (ref 3.8–10.5)
WBC # FLD AUTO: 6.26 K/UL — SIGNIFICANT CHANGE UP (ref 3.8–10.5)

## 2021-05-24 PROCEDURE — 99284 EMERGENCY DEPT VISIT MOD MDM: CPT

## 2021-05-24 PROCEDURE — 71046 X-RAY EXAM CHEST 2 VIEWS: CPT | Mod: 26

## 2021-05-24 RX ADMIN — Medication 100 MILLIGRAM(S): at 18:31

## 2021-05-24 NOTE — ED PROVIDER NOTE - PROGRESS NOTE DETAILS
COMBS:  Labs reassuring.  Hb 9.9, patient denies bloody or dark stools or bleeding.  No c/f hemolysis at this time.  Labs showing eosinophilia, suspect allergic process.  Patient requesting discharge.  Appears stable for discharge.  Will have patient follow with allergy medicine.

## 2021-05-24 NOTE — ED ADULT TRIAGE NOTE - HEIGHT IN INCHES
3 I have reviewed and confirmed nurses' notes for patient's medications, allergies, medical history, and surgical history.

## 2021-05-24 NOTE — ED PROVIDER NOTE - CLINICAL SUMMARY MEDICAL DECISION MAKING FREE TEXT BOX
42 yo F pmh psoriasis on monthly injections presents for cough, eye itching for 3 weeks refractory to abx, steroids, anti-histamine.  VSS no hypoxemia.  Lungs clear on exam without wheeze.  Patient with injection nasal conjunctiva left eye no visual deficit or discharge.  Suspect allergic process given reported itching.  No c/f acs, pe at this time.  Appears euvolemic.  PERC negative.  Will obtain labs, cxr, supportive care.  Dispo pending.

## 2021-05-24 NOTE — ED ADULT NURSE NOTE - NSIMPLEMENTINTERV_GEN_ALL_ED
Implemented All Universal Safety Interventions:  [place] to call system. Call bell, personal items and telephone within reach. Instruct patient to call for assistance. Room bathroom lighting operational. Non-slip footwear when patient is off stretcher. Physically safe environment: no spills, clutter or unnecessary equipment. Stretcher in lowest position, wheels locked, appropriate side rails in place.

## 2021-05-24 NOTE — ED PROVIDER NOTE - NSFOLLOWUPINSTRUCTIONS_ED_ALL_ED_FT
1.  Please return to care for coughing blood, shortness of breath, fever, chills, chest pain.   2.  Continue taking medications as prescribed.   3.  Follow with your pcp or allergy medicine doctor as early as able.

## 2021-05-24 NOTE — ED PROVIDER NOTE - PHYSICAL EXAMINATION
GEN:  Non-toxic appearing, non-distressed, speaking full sentences, non-diaphoretic, AAOx3  HEENT:  NCAT, neck supple, EOMI, PERRLA, sclera anicteric, no conjunctival pallor or injection, no stridor, normal voice, no tonsillar exudate, uvula midline  OD:  Acuity 20/20.  No visual field deficit.  EOMI intact without pain.  No APD.  No conjunctival injection.  Sclera anicteric.    OS:  Acuity 20/20.  No visual field deficit.  EOMI intact without pain.  No APD.  conjunctival injection nasal conjuntiva.  No discharge.  Sclera anicteric.    CV:  regular rhythm and rate, s1/s2 audible, no murmurs, rubs or gallops, peripheral pulses 2+ and symmetric  PULM:  non-labored respirations, lungs clear to auscultation bilaterally, no wheezes, crackles or rales  ABD:  non distended, non-tender, no rebound, no guarding, negative Ortez's sign, bowel sounds normal, no cvat  MSK:  no gross deformity, non-tender extremities and joints, range of motion grossly normal appearing, no extremity edema, extremities warm and well perfused   NEURO:  AAOx3, CN II-XII intact, motor 5/5 in upper and lower extremities bilaterally, sensation grossly intact in extremities and trunk  SKIN:  warm, dry, no rash or vesicles

## 2021-05-24 NOTE — ED PROVIDER NOTE - PATIENT PORTAL LINK FT
You can access the FollowMyHealth Patient Portal offered by [facility] by registering at the following website: [email or web address]. By joining [facility]’s FollowMyHealth portal, you will also be able to view your health information using other applications (apps) compatible with our system.

## 2021-05-24 NOTE — ED PROVIDER NOTE - OBJECTIVE STATEMENT
42 yo F pmh psoriasis on monthly injections presents for cough, eye itching for 3 weeks.  Patient states cough is minimally productive, non-bloody, incessant, associated with post-tussive chest tightness.  Patient also reports b/l eye itching, left eye redness.  No pain, visual changes, blind spots, floaters, photophobia.  Patient seen in ED one week ago for similar sx had negative x-ray and was discharged.  Patient also reported previous urgent care visits where she had been prescribed course antibiotics, steroids, and cetrizine.  Patient also taking cough suppressant syrup.  Denies fever, chills, hemoptysis, sob, chest pain, leg swelling, rash.  Reports family members with similar but milder sx in home.  Denies molds in home.  Non-smoker, denies etoh or illicit drug use.  Works in public school food preparation.  No history of dvt/pe.  Not on ocp.

## 2021-05-24 NOTE — ED ADULT NURSE NOTE - OBJECTIVE STATEMENT
pt received intake spot 5. pt A+Ox3 c/o cough. was seen in ed last week for same. denies fever/chills. respirations even and unlabored. labs sent. IVSL in  place. medicated as ordered. will monitor.

## 2021-12-14 ENCOUNTER — EMERGENCY (EMERGENCY)
Facility: HOSPITAL | Age: 41
LOS: 1 days | Discharge: HOME | End: 2021-12-14
Attending: EMERGENCY MEDICINE | Admitting: EMERGENCY MEDICINE
Payer: COMMERCIAL

## 2021-12-14 VITALS
RESPIRATION RATE: 18 BRPM | HEART RATE: 68 BPM | HEIGHT: 63 IN | TEMPERATURE: 98 F | OXYGEN SATURATION: 100 % | DIASTOLIC BLOOD PRESSURE: 65 MMHG | SYSTOLIC BLOOD PRESSURE: 110 MMHG

## 2021-12-14 VITALS
RESPIRATION RATE: 16 BRPM | DIASTOLIC BLOOD PRESSURE: 61 MMHG | TEMPERATURE: 98 F | OXYGEN SATURATION: 100 % | HEART RATE: 65 BPM | SYSTOLIC BLOOD PRESSURE: 118 MMHG

## 2021-12-14 PROCEDURE — 99284 EMERGENCY DEPT VISIT MOD MDM: CPT

## 2021-12-14 RX ORDER — FAMOTIDINE 10 MG/ML
20 INJECTION INTRAVENOUS DAILY
Refills: 0 | Status: DISCONTINUED | OUTPATIENT
Start: 2021-12-14 | End: 2021-12-17

## 2021-12-14 RX ORDER — CEPHALEXIN 500 MG
1 CAPSULE ORAL
Qty: 21 | Refills: 0
Start: 2021-12-14 | End: 2021-12-20

## 2021-12-14 RX ORDER — CEPHALEXIN 500 MG
1 CAPSULE ORAL
Qty: 14 | Refills: 0
Start: 2021-12-14 | End: 2021-12-20

## 2021-12-14 RX ORDER — POLYETHYLENE GLYCOL 3350 17 G/17G
17 POWDER, FOR SOLUTION ORAL ONCE
Refills: 0 | Status: COMPLETED | OUTPATIENT
Start: 2021-12-14 | End: 2021-12-14

## 2021-12-14 RX ORDER — ACETAMINOPHEN 500 MG
650 TABLET ORAL ONCE
Refills: 0 | Status: COMPLETED | OUTPATIENT
Start: 2021-12-14 | End: 2021-12-14

## 2021-12-14 RX ADMIN — Medication 650 MILLIGRAM(S): at 10:37

## 2021-12-14 RX ADMIN — FAMOTIDINE 20 MILLIGRAM(S): 10 INJECTION INTRAVENOUS at 10:37

## 2021-12-14 RX ADMIN — POLYETHYLENE GLYCOL 3350 17 GRAM(S): 17 POWDER, FOR SOLUTION ORAL at 10:37

## 2021-12-14 NOTE — ED PROVIDER NOTE - PATIENT PORTAL LINK FT
You can access the FollowMyHealth Patient Portal offered by [facility] by registering at the following website: [email or web address]. By joining [facility] FollowMyHealth portal, you will also be able to view your health information using other applications (apps) compatible with our system.

## 2021-12-14 NOTE — ED ADULT TRIAGE NOTE - BRAND OF COVID-19 VACCINATION
Problem: SAFETY ADULT  Goal: Maintain or return to baseline ADL function  Description  INTERVENTIONS:  -  Assess patient's ability to carry out ADLs; assess patient's baseline for ADL function and identify physical deficits which impact ability to perform ADLs (bathing, care of mouth/teeth, toileting, grooming, dressing, etc )  - Assess/evaluate cause of self-care deficits   - Assess range of motion  - Assess patient's mobility; develop plan if impaired  - Assess patient's need for assistive devices and provide as appropriate  - Encourage maximum independence but intervene and supervise when necessary  - Involve family in performance of ADLs  - Assess for home care needs following discharge   - Consider OT consult to assist with ADL evaluation and planning for discharge  - Provide patient education as appropriate  Outcome: Progressing  Goal: Maintain or return mobility status to optimal level  Description  INTERVENTIONS:  - Assess patient's baseline mobility status (ambulation, transfers, stairs, etc )    - Identify cognitive and physical deficits and behaviors that affect mobility  - Identify mobility aids required to assist with transfers and/or ambulation (gait belt, sit-to-stand, lift, walker, cane, etc )  - [place] fall precautions as indicated by assessment  - Record patient progress and toleration of activity level on Mobility SBAR; progress patient to next Phase/Stage  - Instruct patient to call for assistance with activity based on assessment  - Consider rehabilitation consult to assist with strengthening/weightbearing, etc   Outcome: Progressing     Problem: DISCHARGE PLANNING  Goal: Discharge to home or other facility with appropriate resources  Description  INTERVENTIONS:  - Identify barriers to discharge w/patient and caregiver  - Arrange for needed discharge resources and transportation as appropriate  - Identify discharge learning needs (meds, wound care, etc )  - Arrange for interpretive services Moderna dose 1, 2, and 3 to assist at discharge as needed  - Refer to Case Management Department for coordinating discharge planning if the patient needs post-hospital services based on physician/advanced practitioner order or complex needs related to functional status, cognitive ability, or social support system  Outcome: Progressing     Problem: Knowledge Deficit  Goal: Patient/family/caregiver demonstrates understanding of disease process, treatment plan, medications, and discharge instructions  Description  Complete learning assessment and assess knowledge base    Interventions:  - Provide teaching at level of understanding  - Provide teaching via preferred learning methods  Outcome: Progressing

## 2021-12-14 NOTE — ED PROVIDER NOTE - NSFOLLOWUPINSTRUCTIONS_ED_ALL_ED_FT
Advance activity as tolerated.  Continue all previously prescribed medications as directed unless otherwise instructed.  Follow up with your primary care physician in 48-72 hours- bring copies of your results.     Return to the ER for worsening or persistent symptoms, and/or ANY NEW OR CONCERNING SYMPTOMS. If you have issues obtaining follow up, please call: [phone] to obtain a doctor or specialist who takes your insurance in your area.  You may call [phone] to make an appointment with the internal medicine clinic.     Take miralax one packet every morning mixed into any drink.     you can purchase preparation H over the counter. this is a cream for hemorrhoids.    Follow up with your rectal surgeon and primary care doctor    drink lots of fluids and eat fruits and vegetables. Advance activity as tolerated.  Continue all previously prescribed medications as directed unless otherwise instructed.  Follow up with your primary care physician in 48-72 hours- bring copies of your results.     Return to the ER for worsening or persistent symptoms, and/or ANY NEW OR CONCERNING SYMPTOMS. If you have issues obtaining follow up, please call: [phone] to obtain a doctor or specialist who takes your insurance in your area.  You may call [phone] to make an appointment with the internal medicine clinic.     Take keflex 500mg 2 times a day for 7 days    Take miralax one packet every morning mixed into any drink.     You can purchase colace over the counter. this is a stool softener.     you can purchase preparation H over the counter. this is a cream for hemorrhoids.    Follow up with your rectal surgeon and primary care doctor    drink lots of fluids and eat fruits and vegetables.

## 2021-12-14 NOTE — ED PROVIDER NOTE - NSICDXPASTMEDICALHX_GEN_ALL_CORE_FT
PAST MEDICAL HISTORY:  Chronic constipation     GERD (gastroesophageal reflux disease)     Psoriasis

## 2021-12-14 NOTE — ED PROVIDER NOTE - NSICDXFAMILYHX_GEN_ALL_CORE_FT
FAMILY HISTORY:  Family history of diabetes mellitus    Sibling  Still living? Yes, Estimated age: Age Unknown  Family history of breast cancer, Age at diagnosis: Age Unknown

## 2021-12-14 NOTE — ED PROVIDER NOTE - OBJECTIVE STATEMENT
42 y/o F pmhx hemorrhoids, s/p hemorrhoidectomy 2019, chronic constipation and psoriasis, c/o constipation, rectal burning for the last 15 days. she states that she has been feeling constipated and when she goes to the bathroom her stool is hard and there is bright red blood when she wipes. denies n/v, fever, chills. also complains of burning after urination. denies vaginal discharge. she has not taken any stool softeners or used hemorrhoid creams. reports pain and burning when sitting.

## 2021-12-14 NOTE — ED PROVIDER NOTE - ATTENDING CONTRIBUTION TO CARE
I performed a face to face evaluation of this patient and obtained a history and performed a full exam.  I agree with the history, physical exam and plan of the PA.  Pt with rectal pain, constipation and dysuria, belly soft nontender, rectal, chaperone with NP Chrystal with small hemorrhoid, no stool, back no cvat, skin wnl, for urinalysis, meds, and likely discharge home with outpatient followup.

## 2021-12-14 NOTE — ED PROVIDER NOTE - CLINICAL SUMMARY MEDICAL DECISION MAKING FREE TEXT BOX
42 y/o F pmhx hemorrhoids, s/p hemorrhoidectomy 2019, chronic constipation and psoriasis, c/o constipation, rectal burning for the last 15 days.-- abdomen soft non-tender. patient has been eating white rice. has not taken stool softeners or used any topical creams for her hemorrhoid. no active bleeding from rectum. denies fever, chills. pelvic exam normal.-- will check urine, treat pain, miralax for constipation. and follow-up with PMD

## 2021-12-16 LAB
CULTURE RESULTS: SIGNIFICANT CHANGE UP
SPECIMEN SOURCE: SIGNIFICANT CHANGE UP

## 2021-12-16 NOTE — ED POST DISCHARGE NOTE - ADDITIONAL DOCUMENTATION
CHAYO Oliver: Pt called requesting urine culture results, discussed with pt there are no results yet. She is feeling well, will return with any worsening or concerning symptoms

## 2023-09-28 ENCOUNTER — EMERGENCY (EMERGENCY)
Facility: HOSPITAL | Age: 43
LOS: 1 days | Discharge: HOME | End: 2023-09-28
Attending: EMERGENCY MEDICINE | Admitting: EMERGENCY MEDICINE
Payer: COMMERCIAL

## 2023-09-28 VITALS
SYSTOLIC BLOOD PRESSURE: 114 MMHG | HEART RATE: 78 BPM | RESPIRATION RATE: 16 BRPM | OXYGEN SATURATION: 100 % | DIASTOLIC BLOOD PRESSURE: 68 MMHG | TEMPERATURE: 98 F

## 2023-09-28 VITALS
DIASTOLIC BLOOD PRESSURE: 63 MMHG | OXYGEN SATURATION: 100 % | SYSTOLIC BLOOD PRESSURE: 118 MMHG | HEART RATE: 69 BPM | TEMPERATURE: 98 F | RESPIRATION RATE: 16 BRPM

## 2023-09-28 LAB
FLUAV AG NPH QL: SIGNIFICANT CHANGE UP
FLUBV AG NPH QL: SIGNIFICANT CHANGE UP
RSV RNA NPH QL NAA+NON-PROBE: SIGNIFICANT CHANGE UP
SARS-COV-2 RNA SPEC QL NAA+PROBE: DETECTED

## 2023-09-28 PROCEDURE — 73630 X-RAY EXAM OF FOOT: CPT | Mod: 26,LT

## 2023-09-28 PROCEDURE — 99284 EMERGENCY DEPT VISIT MOD MDM: CPT

## 2023-09-28 PROCEDURE — 73600 X-RAY EXAM OF ANKLE: CPT | Mod: 26,LT

## 2023-09-28 PROCEDURE — 93010 ELECTROCARDIOGRAM REPORT: CPT

## 2023-09-28 RX ORDER — ACETAMINOPHEN 500 MG
975 TABLET ORAL ONCE
Refills: 0 | Status: COMPLETED | OUTPATIENT
Start: 2023-09-28 | End: 2023-09-28

## 2023-09-28 RX ADMIN — Medication 975 MILLIGRAM(S): at 14:01

## 2023-09-28 NOTE — ED PROVIDER NOTE - NSFOLLOWUPINSTRUCTIONS_ED_ALL_ED_FT
You were seen in the ED for a fall on your left ankle. You were evaluated in the ED with an Xray which returned _________. You were given tylenol for your pain, with moderate improvement of your symptoms. You may follow up with an orthopedist which we can call you to make an appointment, and we will also attach orthopedic clinics below which you may call. You can return to the ED if your symptoms worsen. Please refrain from strenuous activity, rest the leg, and you may apply ice as needed.     The results of any blood tests and imaging studies completed during your visit today were discussed and explained to you and a copy provided with your discharge instructions. Please follow up with your primary care doctor within 48 hours.    You may take 500-1000 mg acetaminophen (tylenol) every 6 hours, as needed for pain.  You may take 600 mg ibuprofen every 8 hours, with food, as needed for pain.  You can take tylenol and ibuprofen at the same time.     PLEASE RETURN TO ED FOR NEW OR WORSENING SYMPTOMS (intractable nausea/vomiting, severe bleeding, fever over 100.4F, loss of movement of one or more limbs, seizure) You were seen in the ED for a fall on your left ankle. You were evaluated in the ED with an Xray which returned negative for ractures. You were given tylenol for your pain, with moderate improvement of your symptoms. Your symptoms are likely due to a moderate ankle sprain. You may follow up with an orthopedist which we can call you to make an appointment, and we will also attach orthopedic clinics below which you may call. You can return to the ED if your symptoms worsen. Please refrain from strenuous activity, rest the leg, and you may apply ice as needed.     The results of any blood tests and imaging studies completed during your visit today were discussed and explained to you and a copy provided with your discharge instructions. Please follow up with your primary care doctor within 48 hours.    You may take 500-1000 mg acetaminophen (tylenol) every 6 hours, as needed for pain.  You may take 600 mg ibuprofen every 8 hours, with food, as needed for pain.  You can take tylenol and ibuprofen at the same time.     PLEASE RETURN TO ED FOR NEW OR WORSENING SYMPTOMS (intractable nausea/vomiting, severe bleeding, fever over 100.4F, loss of movement of one or more limbs, seizure)

## 2023-09-28 NOTE — ED PROVIDER NOTE - ADDITIONAL NOTES AND INSTRUCTIONS:
Please refrain from strenuous activity at home. Please avoid activities that require you to be on your feet for extended periods of time

## 2023-09-28 NOTE — ED ADULT TRIAGE NOTE - CHIEF COMPLAINT QUOTE
pt s/p fall down 3 steps this morning. states she felt weak and lightheaded prior. states she is being treated for a cough with antibiotics and has been feeling unwell over past few days. pt c/o left lower extremity/ankle pain.

## 2023-09-28 NOTE — ED PROVIDER NOTE - PHYSICAL EXAMINATION
Gen: AAOx3, non-toxic  Head: NCAT  HEENT: EOMI, oral mucosa moist, normal conjunctiva  Lung: CTAB, no respiratory distress, no wheezes/rhonchi/rales B/L,   CV: RRR, no murmurs, rubs or gallops  Abd: soft, NTND, no guarding, no CVA tenderness  MSK: mild lateral ankle swelling at lateral malleolus, moderate ttp to lateral/medial mallelolus, L ankle plantar/dorsiflexion limited 2/2 pain. 5th metatarsal pain. dp present, neurovascularly intact.   Neuro: No focal sensory or motor deficits  Skin: Warm, well perfused, no rash  Psych: normal affect.

## 2023-09-28 NOTE — ED ADULT NURSE NOTE - OBJECTIVE STATEMENT
pt aox4, comes in from home for left ankle/foot pain after falling down a few steps. pt denies head strike, no LOC. left  and limited ROM due to pain. swelling noted. pedal pulses present, pt able to wiggle toes, no sensory deficits on exam. pt medicated as per MD orders. pending xray.

## 2023-09-28 NOTE — ED PROVIDER NOTE - MDM ORDERS SUBMITTED SELECTION
TSH a bit high on recent labs for upcoming poss pregnancy - Endo just adjusted dose - med list reviewed, con't meds/labs/follow up as per Endo Imaging Studies

## 2023-09-28 NOTE — ED PROVIDER NOTE - OBJECTIVE STATEMENT
42 yo F w no PMx presents to the ED s/p fall this morning c/o L ankle pain. Pt states that she fell this morning at 9am down 3 steps outside, but denies any HS, no LOC, no AC. Pt fell outside and braced herself with her hands, but does not have any hand tenderness. Pt states she inverted her L ankle, was ambulatory for only a few steps immediately after, but is unable to ambulate now. Pt states she was dizzy prior to fall because she's been on antibtiocs for some shortness of breath recently, and was pxed prednisone, albuterol, and cefuroxime. Denies fevers, chills, nausea, vomiting, chest pain, abdominal pain, dysuria, hematuria.

## 2023-09-28 NOTE — ED PROVIDER NOTE - CLINICAL SUMMARY MEDICAL DECISION MAKING FREE TEXT BOX
42 yo F w no PMx presents to the ED s/p mechanical fall this morning c/o L ankle pain. Pt states that she fell this morning at 9am down 3 steps outside, but denies any HS, no LOC, no AC. Pt fell outside and braced herself with her hands, but does not have any hand tenderness. Pt states she inverted her L ankle, was ambulatory for only a few steps immediately after, but is unable to ambulate now. Pt states she was dizzy prior to fall because she's been on antibtiocs for some shortness of breath recently, and was pxed prednisone, albuterol, and cefuroxime. Denies fevers, chills, nausea, vomiting, chest pain, abdominal pain, dysuria, hematuria. VSS. Clinically stable. physical exam remarkable for mild lateral ankle swelling at lateral malleolus, moderate ttp to lateral/medial mallelolus, L ankle plantar/dorsiflexion limited 2/2 pain. 5th metatarsal pain. dp present, neurovascularly intact. Clinical suspicion for L ankle sprain vs, ankle fracture, vs kumar fracture, will assess with xray of foot and ankle. Will treat pain with tylenol, will get flu/covid for URI symptoms.

## 2023-09-28 NOTE — ED PROVIDER NOTE - ATTENDING CONTRIBUTION TO CARE
I performed a face to face evaluation of this patient and performed a full history and physical examination on the patient.  I agree with the resident's history, physical examination, and plan of the patient.  42 yo F w no PMx presents to the ED s/p mechanical fall this morning c/o L ankle pain. Pt states that she fell this morning at 9am down 3 steps outside, but denies any HS, no LOC, no AC. Pt fell outside and braced herself with her hands, but does not have any hand tenderness. Pt states she inverted her L ankle, was ambulatory for only a few steps immediately after, but is unable to ambulate now. Pt states she was dizzy prior to fall because she's been on antibtiocs for some shortness of breath recently, and was pxed prednisone, albuterol, and cefuroxime. Denies fevers, chills, nausea, vomiting, chest pain, abdominal pain, dysuria, hematuria. VSS. Clinically stable. physical exam remarkable for mild lateral ankle swelling at lateral malleolus, moderate ttp to lateral/medial mallelolus, L ankle plantar/dorsiflexion limited 2/2 pain. 5th metatarsal pain. dp present, neurovascularly intact. Clinical suspicion for L ankle sprain vs, ankle fracture, vs kumar fracture, will assess with xray of foot and ankle. Will treat pain with tylenol, will get flu/covid for URI symptoms.  Ankle mild ttp no deformity, rest of exam wnl.  Pt will get ankle xrays, covid test and likely dc home.

## 2023-09-28 NOTE — ED PROVIDER NOTE - PATIENT PORTAL LINK FT
You can access the FollowMyHealth Patient Portal offered by [facility] by registering at the following website: [email or web address]. By joining [email or web address] FollowMyHealth portal, you will also be able to view your health information using other applications (apps) compatible with our system.

## 2023-09-28 NOTE — ED PROVIDER NOTE - PROGRESS NOTE DETAILS
xrays negative, will refer to ortho clinic for management of likely ankle sprain. pain w mild improvement after meds

## 2024-02-26 NOTE — DISCHARGE NOTE OB - VISION (WITH CORRECTIVE LENSES IF THE PATIENT USUALLY WEARS THEM):
Initiate Treatment: ketoconazole 2 % topical cream Bid Detail Level: Simple Render In Strict Bullet Format?: No Initiate Treatment: hydrocortisone 2.5 % topical cream BID Normal vision: sees adequately in most situations; can see medication labels, newsprint

## 2024-05-09 NOTE — H&P PST ADULT - CURRENT SWALLOWING
[FreeTextEntry1] : 16 yo male with status post spinal fusion and bone graft and came to see me for signifcaint paraspinal tenderness and focal back pain relieved from trigger point paraspinal muscle injection  I encourage Physical therapy for core sterngthinging and paraspinal stretching  I wrote letters for clearance to participate in PE with weight precaution  if pt has lingering NECK pain, pt can reach out to office and will start work up I had detailed discussions and went over MRI again  i went over the exercises and precautions and how to lift the heavy ojeccts THank you for being patient  PT referal on the way.   (0) swallows foods and liquids w/o difficulty

## 2024-12-17 NOTE — DISCHARGE NOTE OB - NS AS DC PROVIDER CONTACT Y/N MULTI
Problem: Physical Therapy  Goal: Physical Therapy Goal  Description: Goals to be met by: 2024    Patient will increase functional independence with mobility by performin. Supine to sit with Standby Assistance.  2. Sit to supine with Standby Assistance.  3. Bed to chair transfer with Standby Assistance with rolling walker using Step Transfer technique.  4. Sit to Stand with Standby Assistance with rolling walker.  5. Gait  x 100  feet with Standby Assistance with rolling walker.  6. Lower extremity exercise program x10 reps, with assistance as needed.     Outcome: Progressing      Yes